# Patient Record
Sex: FEMALE | Race: WHITE
[De-identification: names, ages, dates, MRNs, and addresses within clinical notes are randomized per-mention and may not be internally consistent; named-entity substitution may affect disease eponyms.]

---

## 2018-02-21 ENCOUNTER — HOSPITAL ENCOUNTER (EMERGENCY)
Dept: HOSPITAL 45 - C.EDB | Age: 23
Discharge: HOME | End: 2018-02-21
Payer: COMMERCIAL

## 2018-02-21 VITALS — HEART RATE: 82 BPM | SYSTOLIC BLOOD PRESSURE: 110 MMHG | DIASTOLIC BLOOD PRESSURE: 64 MMHG | OXYGEN SATURATION: 100 %

## 2018-02-21 VITALS — TEMPERATURE: 97.88 F

## 2018-02-21 VITALS
WEIGHT: 105.82 LBS | BODY MASS INDEX: 20.78 KG/M2 | BODY MASS INDEX: 20.78 KG/M2 | WEIGHT: 105.82 LBS | HEIGHT: 60 IN | HEIGHT: 60 IN

## 2018-02-21 DIAGNOSIS — M54.32: ICD-10-CM

## 2018-02-21 DIAGNOSIS — M54.31: ICD-10-CM

## 2018-02-21 DIAGNOSIS — Z72.0: ICD-10-CM
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
## 2018-02-21 DIAGNOSIS — S39.012A: Primary | ICD-10-CM
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
## 2018-02-21 NOTE — DIAGNOSTIC IMAGING REPORT
L-SPINE MIN 4 VIEWS ROUTINE, SACRUM   COCCYX MIN 2 VIEWS



CLINICAL HISTORY: low back pain   



COMPARISON STUDY:  Abdomen and pelvis CT 7/6/2016.



FINDINGS: Stable 3 mm stone within the lower pole of the right kidney. No

fracture or subluxation within the lumbar spine. Disc spaces are relatively

preserved. No fractures identified within the sacrum or coccyx. Bilateral

sacroiliac joints are within normal limits. Presacral soft tissues are

maintained. Small calcification within the right deep pelvis consistent with a

phlebolith.



IMPRESSION:  

1. No fracture or subluxation within the lumbar spine.

2. The sacrum and coccyx are within normal limits.

3. Stable right-sided nephrolithiasis. 









Electronically signed by:  Hilario Miller M.D.

2/21/2018 9:04 PM



Dictated Date/Time:  2/21/2018 9:00 PM

## 2018-02-21 NOTE — EMERGENCY ROOM VISIT NOTE
ED Visit Note


First contact with patient:  19:47


CHIEF COMPLAINT:  Low back pain





HISTORY OF PRESENT ILLNESS:  This 22-year-old female patient presents to the 

emergency department, ambulatory, complaining of pain in the low back which 

began a few days ago, but worsened today.  The pain was gradual in onset, is 

now constant and worse with movement.  The patient notes the pain as sharp and 

shooting and a 10/10 at its worst.  The patient has taken 400 mg ibuprofen 

without relief of the pain.  The patient denies any loss of control of their 

bowel or bladder functions.  There has been no leg numbness or weakness, and no 

change in sensation.  No nausea or vomiting or abdominal pain.  No chest pain 

or shortness of breath.  The patient has not had prior back injuries, but 

states she does occasionally get similar pain to this, usually with certain 

exercises.  No dysuria or increased urinary frequency.  The patient's symptoms 

are better now than they were earlier today.





REVIEW OF SYSTEMS:   A 10 system review of systems was performed with positives 

and pertinent negatives listed in the history of present illness. All other 

systems were reviewed and are negative.





ALLERGIES: None





MEDICATIONS: None





PMH: None





SOCIAL HISTORY:    The patient is a [employer] student.  She lives locally with 

her roommates.  She denies drug use.  She admits to occasional alcohol and 

daily tobacco use.


  


PHYSICAL EXAM: 


VITALS: Vitals are noted on the nurse's note and reviewed by myself.  Vital 

signs stable.


GENERAL: This is a 22-year-old female, in no acute distress, nondiaphoretic, 

well-developed well-nourished.


SKIN: The skin was without rashes, erythema, edema, or bruising.  Capillary 

refill less than 2 seconds.    


NECK: Supple without nuchal rigidity.  No cervical spine tenderness.  No 

paraspinous muscle tenderness.    


HEART: Regular rate and rhythm without murmurs gallops or rubs.


LUNGS: Clear to auscultation bilaterally without wheezes, rales or rhonchi.  


ABDOMEN: Positive bowel sounds x 4.  Normal tympanic percussion.  Soft, 

nontender, without masses or organomegaly.  Olguin sign negative.


MUSCULOSKELETAL: No muscle atrophy, erythema, or edema noted of the back.  

There is no tenderness over the lumbar spinous processes.  There is mild 

tenderness over the paraspinous muscles bilaterally.  There is no tenderness 

over the thoracic spine or paraspinous muscles.  There are muscle spasms 

present.  The patient is slow to move around with maximum tenderness with 

sitting from a lying position.  Positive straight leg raise test on the right.  

There is tenderness at the SI joint.


NEURO: Patient was alert and oriented to person place and time.  Normal 

sensation to light and sharp touch.  Deep tendon reflexes 2+ in the lower 

extremities.  Dorsalis pedis pulse 2+ bilaterally.  Strength 5/5 and equal in 

the bilateral lower extremities.





RADIOLOGY:


L-SPINE MIN 4 VIEWS ROUTINE, SACRUM   COCCYX MIN 2 VIEWS





CLINICAL HISTORY: low back pain   





COMPARISON STUDY:  Abdomen and pelvis CT 7/6/2016.





FINDINGS: Stable 3 mm stone within the lower pole of the right kidney. No


fracture or subluxation within the lumbar spine. Disc spaces are relatively


preserved. No fractures identified within the sacrum or coccyx. Bilateral


sacroiliac joints are within normal limits. Presacral soft tissues are


maintained. Small calcification within the right deep pelvis consistent with a


phlebolith.





IMPRESSION:  


1. No fracture or subluxation within the lumbar spine.


2. The sacrum and coccyx are within normal limits.


3. Stable right-sided nephrolithiasis. 














Electronically signed by:  Hilario Miller M.D.


2/21/2018 9:04 PM





Dictated Date/Time:  2/21/2018 9:00 PM








EMERGENCY DEPARTMENT COURSE: The patient was seen and evaluated as above.  She 

has symptoms consistent with lumbar strain with sciatica, and certainly does 

not exhibit any signs of infection, abscess, or cauda equina syndrome.  She was 

given 8 mg Decadron and 5 mg Flexeril with moderate improvement in her 

symptoms.  X-rays were ordered and reviewed by myself and radiologist as above.

  The patient was reassessed and notes significant improvement.  She is able to 

ambulate more easily now than previously.  The patient will be discharged home 

on steroids and muscle relaxers to help with her symptoms, and is agreeable to 

this assessment and plan.  She was encouraged to follow-up outpatient with 

[facility] for possible physical therapy referral.  The patient'

s questions were answered to her satisfaction.  Discharge instructions reviewed

, and the patient was discharged home in good condition.





I attest that I have personally reviewed the patient's current medication list. 


Patient was found to have normal blood pressure on screening and does not 

require follow-up. 





Etiologies such as lumbago, sciatica, cauda equina, epidural abscess, 

osteomyelitis, fracture, aortic disease, metastatic disease, infection, renal 

colic, gastrointestinal, as well as others were entertained.  





DIAGNOSIS:  Lumbar strain, sciatica


Current/Historical Medications


Scheduled


Methylprednisolone (Medrol Dosepak), 0 PO DAILY





Scheduled PRN


Cyclobenzaprine Hcl (Flexeril), 5 MG PO TID PRN for Muscle Spasms


Ibuprofen Tab (Advil), 400 MG PO Q6 PRN for Pain





Allergies


Coded Allergies:  


     No Known Allergies (Unverified , 3/16/17)





Vital Signs











  Date Time  Temp Pulse Resp B/P (MAP) Pulse Ox O2 Delivery O2 Flow Rate FiO2


 


2/21/18 21:48  82 18 110/64 100   


 


2/21/18 19:52  84 18 107/68 100 Room Air  


 


2/21/18 17:33 36.6 82 18 97/61 96 Room Air  











Medications Administered











 Medications


  (Trade)  Dose


 Ordered  Sig/Frank


 Route  Start Time


 Stop Time Status Last Admin


Dose Admin


 


 Dexamethasone


 Sodium Phosphate


  (Dexamethasone


 Inj **Pf**)  8 mg  NOW  STAT


 IM  2/21/18 20:00


 2/21/18 20:02 DC 2/21/18 20:08


8 MG


 


 Cyclobenzaprine


 HCl


  (Flexeril Tab)  5 mg  NOW  STAT


 PO  2/21/18 20:00


 2/21/18 20:02 DC 2/21/18 20:08


5 MG











Departure Information


Impression





 Primary Impression:  


 Strain of lumbar region


 Additional Impression:  


 Sciatica





Dispostion


Home / Self-Care





Condition


GOOD





Prescriptions





Cyclobenzaprine Hcl (FLEXERIL) 5 Mg Tab


5 MG PO TID Y for Muscle Spasms, #15 TAB


   PRN


   Prov: Alyce Giron PA-C         2/21/18 


Methylprednisolone (MEDROL DOSEPAK) 4 Mg Juan


0 PO DAILY, #1 PKT


   Prov: Alyce Giron PA-C         2/21/18





Referrals


[facility] (PCP)





Patient Instructions


ED Low Back Pain Injury, Exercises Back Lower Back Stretch, My [facility]





Additional Instructions





You have been treated in the Emergency Department for Back Pain.  You have 

received pain medicine in the emergency department which impairs your ability 

to operate a vehicle.





You have been prescribed Flexeril (cyclobenzaprine) 1-2 tabs orally, three 

times per day. Do NOT exceed 30 mg (6 tabs) per day. Take your first dose at 

bedtime as it can make you drowsy. Always take all medications as prescribed.





 You have been prescribed a Medrol Dosepak. This is a steroid which will help 

decrease your inflammation, redness, and itch. Take the medicine as prescribed.

  Take the ENTIRE 6 day course of the steroids.  Do not take any NSAIDs 

including ibuprofen, naproxen, Motrin, Aleve, Advil while taking this 

medication.  You may take Tylenol (acetaminophen).





For pain control, you can use the following over-the-counter medicines (if >11 yo):


Ibuprofen(Motrin, Advil) may be used for fever or pain.  Use 600mg every six 

hours as needed.  Take with food.  Avoid using more than 2400mg in a 24 hour 

period.  Do not use 2400mg per day for more than three consecutive days without 

physician direction.  Prolonged inappropriate use can lead to stomach upset or 

ulcers.  Do not take this medication while taking steroids.


(AND/OR)


Acetaminophen(Tylenol) may be used for fever or pain.  Use 1000mg every six 

hours as needed.  Avoid using more than 3000mg in a 24 hour period.  





If this is an acute injury, ice can be applied to the area of pain for the 

first 3 days to help decrease pain and inflammation. After the first 3 days, a 

heating pad can be used over the area for continued soothing relief.





You should schedule a follow-up appointment in 2-3 days with your Primary Care 

Provider/[facility] for further evaluation and treatment of 

your back pain.





Return to the Emergency Department if your current symptoms worsen despite 

treatment course outlined above, or if you develop any of the following symptoms

: intractable pain despite aforementioned treatment course, loss of control of 

your bowel or bladder, numbness or tingling in your groin, or development of a 

fever.





School Instructions


Return To School:  2 days





Problem Qualifiers








 Primary Impression:  


 Strain of lumbar region


 Encounter type:  initial encounter  Qualified Codes:  S39.012A - Strain of 

muscle, fascia and tendon of lower back, initial encounter


 Additional Impression:  


 Sciatica


 Laterality:  bilateral  Qualified Codes:  M54.31 - Sciatica, right side; 

M54.32 - Sciatica, left side

## 2018-04-16 ENCOUNTER — HOSPITAL ENCOUNTER (EMERGENCY)
Dept: HOSPITAL 45 - C.EDB | Age: 23
Discharge: HOME | End: 2018-04-16
Payer: COMMERCIAL

## 2018-04-16 VITALS
HEIGHT: 60 IN | BODY MASS INDEX: 20.86 KG/M2 | WEIGHT: 106.26 LBS | BODY MASS INDEX: 20.86 KG/M2 | WEIGHT: 106.26 LBS | HEIGHT: 60 IN

## 2018-04-16 VITALS
TEMPERATURE: 98.24 F | SYSTOLIC BLOOD PRESSURE: 100 MMHG | OXYGEN SATURATION: 97 % | DIASTOLIC BLOOD PRESSURE: 60 MMHG | HEART RATE: 70 BPM

## 2018-04-16 DIAGNOSIS — M54.16: ICD-10-CM

## 2018-04-16 DIAGNOSIS — F17.200: Primary | ICD-10-CM

## 2018-04-16 NOTE — EMERGENCY ROOM VISIT NOTE
History


First contact with patient:  16:02


Chief Complaint:  BACK PAIN


Stated Complaint:  SCIATICA





History of Present Illness


The patient is a 22 year old female who presents to the Emergency Room with 

complaints of worsening back pain radiating down the right posterior thigh and 

leg region.  The patient reports that the pain alternates between her buttock 

and leg region.  The patient reports that she was here in February with similar 

symptoms.  She was provided some shots and prescription for a muscle relaxer 

and steroid.  The patient reports that she then followed up with a physical 

therapist at [facility].  The patient reports that she 

only had 3 sessions.  She also saw a chiropractor for a few sessions as well.  

When asked if the patient actually saw a provider at [facility], she does not think that she did.  The patient reports that she is going 

back home to [facility] in 2 weeks when the semester is over.  With her 

current symptoms, the patient denies any bladder/bowel incontinence, saddle 

anesthesias or lower extremity weakness.  She is finding it difficult to study 

for finals as she has to lay mostly on her stomach to relieve the pain.  She 

rates her discomfort a 10 out of 10.





Review of Systems


10 system review was performed and was negative except for pertinent positives 

and negatives as indicated in history of present illness





Past Medical/Surgical History


Medical Problems:


(1) Immunizations up to date








Family History





Patient reports no known family medical history.





Social History


Smoking Status:  Current Every Day Smoker


Drug Use:  none


Marital Status:  single


Occupation Status:  [employer]





Current/Historical Medications


Scheduled


Methylprednisolone (Medrol Dosepak), 0 PO DAILY





Scheduled PRN


Ibuprofen Tab (Advil), 400 MG PO Q6 PRN for Pain


Tramadol (Ultram), 1-2 TAB PO Q4H PRN for Pain





Physical Exam


Vital Signs











  Date Time  Temp Pulse Resp B/P (MAP) Pulse Ox O2 Delivery O2 Flow Rate FiO2


 


4/16/18 15:58 36.8 70 18 100/60 97 Room Air  











Physical Exam


CONSTITUTIONAL:  Healthy and well nourished.  Alert and oriented X 3 with 

positive affect.  Patient does not appear in any acute distress.  The patient 

was laying in the supine position during exam.


HEENT:  Normocephalic, atraumatic.  Pupils equal, round and reactive.  


NECK:  Full active range of motion without discomfort.


RESPIRATORY:  Clear to auscultation bilaterally with no wheezing, crackles, 

rhonchi or stridor.


CARDIOVASCULAR:  Regular rate and rhythm with no murmurs, rubs or gallops.


GASTROINTESTINAL:  Bowel sounds present in all quadrants.  Soft and nontender 

to palpation.


MUSCULOSKELETAL: Examination shows generalized tenderness to palpation through 

the right lower lumbar region and SI joint.  Negative logroll.  Mildly positive 

straight leg raise.  Ankle plantar and dorsiflexion strength are 5 out of 5 and 

symmetric bilaterally.  Pedal pulses are intact.


INTEGUMENTARY:  No rash or other significant dermatologic conditions noted.


NEUROLOGIC: Lower extremities are sensory intact with deep tendon reflexes 2+ 

and symmetric bilaterally.





Medical Decision & Procedures


Medications Administered











 Medications


  (Trade)  Dose


 Ordered  Sig/Frank


 Route  Start Time


 Stop Time Status Last Admin


Dose Admin


 


 Ketorolac


 Tromethamine


  (Toradol Inj)  60 mg  NOW  STAT


 IM  4/16/18 16:34


 4/16/18 16:36 DC 4/16/18 17:09


60 MG


 


 Dexamethasone


 Sodium Phosphate


  (Dexamethasone


 Inj **Pf**)  10 mg  NOW  ONCE


 IM  4/16/18 16:45


 4/16/18 16:46 DC 4/16/18 17:09


10 MG


 


 Tramadol HCl


  (Ultram Tab)  50 mg  NOW  STAT


 PO  4/16/18 16:34


 4/16/18 16:36 DC 4/16/18 17:08


50 MG











ED Course


Patient history and physical exam were performed.  Nurse's notes were reviewed.

  Vital signs were reviewed and were normal.  I also reviewed documentation 

from the patient's visit on 2/21/18.  She did have x-rays performed that were 

normal.  She was provided prescriptions for Flexeril and a Medrol Dosepak, and 

was referred back to her PCP or [facility] for follow-up.

  The patient appears to have a worsening radiculitis at this time.  I did 

suggest starting her again on a corticosteroid, and providing a prescription 

for Ultram.  The patient was advised that she could alternate ibuprofen and 

Tylenol for additional baseline pain relief.  The patient was encouraged to 

either follow-up with [facility] officially, or she was 

also provided contact information for [facility].  She was 

provided contact information for Dr. Prieto, spine surgeon, although I doubt 

that she would have any definitive evaluation and treatment plan established 

within the next 2 weeks.  The patient has essentially met criteria for most 

insurance companies to authorize an MRI, however I explained to the patient 

that the emergency department does not do prior authorization for MRI studies.  

The patient was advised that if she does not have an MRI performed prior to 

going home, she may discuss this with her PCP back in [facility].  The 

patient was administered Toradol 60 mg and Decadron 10 mg IM.  The patient was 

happy with plan of care, voiced understanding of all discharge instructions, 

was appreciative of the education that I provided over an approximate 30 

minutes evaluation, and rated her discomfort a 6 out of 10 at the conclusion of 

my exam.





Medical Decision


See previous section.  Patient history and clinical exam findings are not 

consistent with cauda equina syndrome.  I do not suspect spinal abscess, 

hematoma or discitis.  Her symptoms are consistent with a lumbar radiculitis.





[place] Drug Monitoring Program


Search Results:  patient reviewed within database, no issues identified





Medication Reconcilliation


Current Medication List:  was personally reviewed by me





Blood Pressure Screening


Patient's blood pressure:  Normal blood pressure





Impression





 Primary Impression:  


 Right lumbar radiculitis





Departure Information


Dispostion


Home / Self-Care





Condition


FAIR





Prescriptions





Methylprednisolone (MEDROL DOSEPAK) 4 Mg Juan


0 PO DAILY, #1 PKT


   Prov: Geovanni Choi PA         4/16/18 


Tramadol (Ultram) 50 Mg Tab


1-2 TAB PO Q4H Y for Pain, #30 TAB


   For Initial Treatment


   Prov: Geovanni Choi PA         4/16/18





Referrals


Navid Prieto D.O. Wagner,Venita JIMENEZP.





Forms


HOME CARE DOCUMENTATION FORM,                                                 

               IMPORTANT VISIT INFORMATION





Patient Instructions


Lumbar Radiculopathy, My [facility]





Additional Instructions





Try to avoid sitting for long periods of time.  Avoid heavy lifting or other 

strenuous activities.


Ibuprofen 800 mg and/or Tylenol 1000 mg every 8 hours.


You may also alternate these medications for more effective pain relief:


Ibuprofen --4 HRS--> Tylenol --4 HRS--> ibuprofen --4 HRS--> Tylenol ....


Ultram if needed for worse pain.


Take Medrol Dosepak as prescribed, next dose tomorrow evening.





Suggest follow-up with [facility] or [facility] 

[facility] (JENNIFER Fernandez) for further reevaluation and management.


You may also try to contact the [facility] (Dr. Priteo

) to see if they can evaluate you before you return home.





Return to the emergency department for any developing bladder/bowel incontinence

, numbness of the inner thigh/pubic region or profound right lower extremity 

weakness.

## 2018-04-22 ENCOUNTER — HOSPITAL ENCOUNTER (EMERGENCY)
Dept: HOSPITAL 45 - C.EDB | Age: 23
Discharge: HOME | End: 2018-04-22
Payer: COMMERCIAL

## 2018-04-22 VITALS — OXYGEN SATURATION: 98 % | DIASTOLIC BLOOD PRESSURE: 54 MMHG | SYSTOLIC BLOOD PRESSURE: 95 MMHG | HEART RATE: 57 BPM

## 2018-04-22 VITALS
BODY MASS INDEX: 19.11 KG/M2 | WEIGHT: 105.16 LBS | WEIGHT: 105.16 LBS | HEIGHT: 62.01 IN | HEIGHT: 62.01 IN | BODY MASS INDEX: 19.11 KG/M2

## 2018-04-22 VITALS — TEMPERATURE: 97.7 F

## 2018-04-22 DIAGNOSIS — F17.200: ICD-10-CM

## 2018-04-22 DIAGNOSIS — M51.16: Primary | ICD-10-CM

## 2018-04-22 NOTE — EMERGENCY ROOM VISIT NOTE
History


First contact with patient:  18:04


Chief Complaint:  OTHER COMPLAINT


Stated Complaint:  SIACTICA





History of Present Illness


The patient is a 22 year old female who presents to the Emergency Room with 

complaints of persistent low back pain.  The patient reports that she has had 

pain in her low back for the past 2 months.  She states the pain radiates into 

her right buttock and right thigh.  She has relief laying down on her side and 

states the pain is worse when she is up walking around or sitting.  She rates 

her current discomfort a 10/10.  She states the pain as a dull pain and she has 

associated numbness at times in her right leg and buttocks.  This numbness is 

intermittent.  The pain sometimes radiates all the way down the leg into the 

right foot.  She states that she has tingling and feelings of pins and needles 

at times.  She has had courses of steroids and injections for the pain.  She 

has seen [facility] and did physical therapy but states that 

she did not like physical therapy, because they did not perform any tests and 

did not put her on a set schedule.  She has been to the chiropractor twice and 

has had massages without relief of her pain.  She was seen by orthopedic spine 

and they are working to have an MRI authorized.  The patient states that she 

finished her steroid pack today and does not want to wait any longer for an 

MRI.  She denies any bowel/bladder incontinence, urinary symptoms, fevers or 

weakness.





Review of Systems


A complete 10 point review of systems was reviewed with the patient with 

pertinent positives and negatives as per history of present illness. All else 

were negative.





Past Medical/Surgical History


Medical Problems:


(1) Immunizations up to date








Family History





Patient reports no known family medical history.





Social History


Smoking Status:  Current Some Day Smoker


Drug Use:  none


Marital Status:  single


Occupation Status:  [employer] student





Current/Historical Medications


Scheduled


Methylprednisolone (Medrol Dosepak), 0 PO DAILY





Scheduled PRN


Acetaminophen (Tylenol), 1,000 MG PO Q6H PRN for Pain


Ibuprofen Tab (Advil), 400 MG PO Q6H PRN for Pain


Tramadol (Ultram), 1-2 TAB PO Q4H PRN for Pain





Physical Exam


Vital Signs











  Date Time  Temp Pulse Resp B/P (MAP) Pulse Ox O2 Delivery O2 Flow Rate FiO2


 


4/22/18 20:56  57 18 95/54 98   


 


4/22/18 17:57 36.5 86 18 102/69 97 Room Air  











Physical Exam


VITALS: Vitals are noted on the nurse's note and reviewed by myself.  Vital 

signs stable.


GENERAL: This is a 22-year-old female, in no apparent distress, well-developed 

well-nourished.


SKIN: The skin was without rashes.


HEAD: Normocephalic atraumatic.  


HEART: Regular rate and rhythm without murmurs gallops or rubs.


LUNGS: Clear to auscultation bilaterally without wheezes, rales or rhonchi.  


ABDOMEN: Positive bowel sounds x 4.  Soft, nontender to palpation.


MUSCULOSKELETAL: No significant tenderness to palpation in the lumbar spine or 

paraspinous muscles.  Full range of motion and strength 5/5 in bilateral lower 

extremities.


NEURO: Patient was alert and oriented to person place and time.  Sensation 

intact over bilateral lower extremities.  Patellar reflexes 2+ bilaterally.





Medical Decision & Procedures


ER Provider


Diagnostic Interpretation:


LUMBAR SPINE W/O CONTRAST





HISTORY: Pain  low back pain, radicular sxs right leg, numbness





TECHNIQUE: Multiplanar multisequence MRI of the lumbar spine was performed


without the use of contrast.





COMPARISON:  None.





FINDINGS: For the purpose of the report the L5-S1 disc space will be located on


axial image 2431.


Normal signal characteristics of the vertebral bodies. Moderate degenerative


disc change L5-S1 with mild disc desiccation L4-L5. Posterior disc herniation


L5-S1 with a posterior extruded disc. Bulging disc component L4-L5





L1-L2: No significant central canal or neural foraminal narrowing.





L2-L3: No significant central canal or neural foraminal narrowing.





L3-L4: No significant central canal or neural foraminal narrowing.





L4-L5: Central bulging disc creating a mild impact upon the anterior thecal sac.


Neuroforamina are patent bilaterally.





L5-S1: Right posterior disc herniation with extruded disc fragment extending


posterior to the S1 segment. The extruded disc fragment has a maximum


cephalocaudal dimension of 1.5 cm with transaxial measurements of 10 x 8 mm.


Local impact 1 thecal sac and associated nerve roots is present. There is


minimal narrowing of the right neuroforamina. Left neural foramina is patent.


Bulk of the impact his upon the thecal sac itself.





IMPRESSION:  





1. Prominent right posterior disc herniation L5-S1.


2. This is associated with an extruded disc extending posterior to the right


central aspect of the S1 segment. Dimensions of the extruded disc fragment are


1.5 cm x 10 x 8 mm in cross-section


3. Localized mass effect as described with considerable deformity/displacement


of the thecal sac posterior to the S1 segment.


4. Central bulging disc L4-L5. 





Electronically signed by:  Damaso Lee M.D.





Laboratory Results











Test


  4/22/18


18:25


 


Urine Color YELLOW 


 


Urine Appearance CLOUDY (CLEAR) 


 


Urine pH 7.0 (4.5-7.5) 


 


Urine Specific Gravity


  1.017


(1.000-1.030)


 


Urine Protein NEG (NEG) 


 


Urine Glucose (UA) NEG (NEG) 


 


Urine Ketones NEG (NEG) 


 


Urine Occult Blood NEG (NEG) 


 


Urine Nitrite NEG (NEG) 


 


Urine Bilirubin NEG (NEG) 


 


Urine Urobilinogen NEG (NEG) 


 


Urine Leukocyte Esterase NEG (NEG) 


 


Urine WBC (Auto) 1-5 /hpf (0-5) 


 


Urine RBC (Auto) 0-4 /hpf (0-4) 


 


Urine Hyaline Casts (Auto) 1-5 /lpf (0-5) 


 


Urine Epithelial Cells (Auto)


  20-30 /lpf


(0-5)


 


Urine Bacteria (Auto) NEG (NEG) 


 


Urine Pregnancy Test NEG (NEG) 











ED Course


The patient was evaluated as above. 





Patient declined any pain medication.





MRI was performed and read by radiology as above. 





I spoke with Dr. Prieto regarding the patient.  He recommended follow up in the 

office within 48 hours.





Patient was reevaluated and treatment plan was discussed.  The patient was 

agreeable to the treatment plan.





Discharge instructions were reviewed with the patient.  The patient verbalized 

understanding of my assessment and treatment plan and was discharged home in 

good condition.





Medical Decision


Differential diagnosis includes cauda equina syndrome, cord compression, disc 

herniation, muscle spasm, lumbar strain, epidural abscess, malignancy, 

transverse myelitis, urinary tract infection, colitis, diverticulitis, kidney 

stone, among others.





The patient is a 22-year-old female who presents today complaining of 

persistent low back pain.  Patient has had multiple visits here and 

additionally has seen orthopedic spine and [facility] services for this 

issue.  She has been on multiple rounds of steroids and has been taking 

tramadol for the pain.  Patient complains of worsening numbness in her right 

groin which has been intermittent.  Sensation is intact on exam today, however 

given worsening radicular symptoms MRI was ordered.  This did show a large disc 

herniation as described above.  Discussed the case with Dr. Prieto of 

orthopedic spine, who recommended follow-up in the office within 48 hours.  The 

patient declined any further analgesics, and states that the tramadol works 

well for her.  She was advised to call the office first thing in the morning.  

She will return for any worsening or new/concerning symptoms.  She verbalized 

understanding of my assessment and treatment plan and was discharged home in 

good condition.





[place] Drug Monitoring Program


Search Results:  patient reviewed within database, no issues identified





Medication Reconcilliation


Current Medication List:  was personally reviewed by me





Blood Pressure Screening


Patient's blood pressure:  Normal blood pressure





Impression





 Primary Impression:  


 Lumbar disc herniation with radiculopathy





Departure Information


Dispostion


Home / Self-Care





Condition


GOOD





Referrals


No Doctor, Assigned (PCP)








Lee Brothers M.D.





Patient Instructions


My [facility]





Additional Instructions





You have been treated in the Emergency Department for Back Pain. 





Continue your previous pain medications as prescribed.





Contact Dr. Brothers's office first thing in the morning to schedule follow-up 

appointment either Monday or Tuesday.  You can tell them that you were in the 

emergency department and had an MRI, and that we spoke with Dr. Prieto who 

recommended to follow-up within 48 hours.





Return to the Emergency Department if your current symptoms worsen despite 

treatment course outlined above, or if you develop any of the following symptoms

: intractable pain despite aforementioned treatment course, loss of control of 

your bowel or bladder, numbness or tingling in your groin, or development of a 

fever.

## 2018-04-22 NOTE — DIAGNOSTIC IMAGING REPORT
LUMBAR SPINE W/O CONTRAST



HISTORY: Pain  low back pain, radicular sxs right leg, numbness



TECHNIQUE: Multiplanar multisequence MRI of the lumbar spine was performed

without the use of contrast.



COMPARISON:  None.



FINDINGS: For the purpose of the report the L5-S1 disc space will be located on

axial image 2431.

Normal signal characteristics of the vertebral bodies. Moderate degenerative

disc change L5-S1 with mild disc desiccation L4-L5. Posterior disc herniation

L5-S1 with a posterior extruded disc. Bulging disc component L4-L5



L1-L2: No significant central canal or neural foraminal narrowing.



L2-L3: No significant central canal or neural foraminal narrowing.



L3-L4: No significant central canal or neural foraminal narrowing.



L4-L5: Central bulging disc creating a mild impact upon the anterior thecal sac.

Neuroforamina are patent bilaterally.



L5-S1: Right posterior disc herniation with extruded disc fragment extending

posterior to the S1 segment. The extruded disc fragment has a maximum

cephalocaudal dimension of 1.5 cm with transaxial measurements of 10 x 8 mm.

Local impact 1 thecal sac and associated nerve roots is present. There is

minimal narrowing of the right neuroforamina. Left neural foramina is patent.

Bulk of the impact his upon the thecal sac itself.



IMPRESSION:  



1. Prominent right posterior disc herniation L5-S1.





2. This is associated with an extruded disc extending posterior to the right

central aspect of the S1 segment. Dimensions of the extruded disc fragment are

1.5 cm x 10 x 8 mm in cross-section





3. Localized mass effect as described with considerable deformity/displacement

of the thecal sac posterior to the S1 segment.

4. Central bulging disc L4-L5. 







The above report was generated using voice recognition software.  It may contain

grammatical, syntax or spelling errors.







Electronically signed by:  Damaso Lee M.D.

4/22/2018 8:13 PM



Dictated Date/Time:  4/22/2018 8:06 PM

## 2018-04-23 ENCOUNTER — HOSPITAL ENCOUNTER (INPATIENT)
Dept: HOSPITAL 45 - C.EDB | Age: 23
LOS: 4 days | Discharge: HOME | DRG: 454 | End: 2018-04-27
Attending: ORTHOPAEDIC SURGERY | Admitting: ORTHOPAEDIC SURGERY
Payer: COMMERCIAL

## 2018-04-23 VITALS
BODY MASS INDEX: 20.13 KG/M2 | WEIGHT: 102.51 LBS | HEIGHT: 60 IN | BODY MASS INDEX: 20.13 KG/M2 | HEIGHT: 60 IN | WEIGHT: 102.51 LBS

## 2018-04-23 VITALS
OXYGEN SATURATION: 94 % | TEMPERATURE: 98.06 F | SYSTOLIC BLOOD PRESSURE: 99 MMHG | HEART RATE: 66 BPM | DIASTOLIC BLOOD PRESSURE: 62 MMHG

## 2018-04-23 VITALS
SYSTOLIC BLOOD PRESSURE: 110 MMHG | DIASTOLIC BLOOD PRESSURE: 73 MMHG | TEMPERATURE: 98.24 F | HEART RATE: 63 BPM | OXYGEN SATURATION: 100 %

## 2018-04-23 VITALS
DIASTOLIC BLOOD PRESSURE: 64 MMHG | HEART RATE: 61 BPM | SYSTOLIC BLOOD PRESSURE: 107 MMHG | TEMPERATURE: 98.24 F | OXYGEN SATURATION: 99 %

## 2018-04-23 VITALS — OXYGEN SATURATION: 100 %

## 2018-04-23 VITALS
TEMPERATURE: 98.06 F | DIASTOLIC BLOOD PRESSURE: 58 MMHG | SYSTOLIC BLOOD PRESSURE: 98 MMHG | OXYGEN SATURATION: 100 % | HEART RATE: 72 BPM

## 2018-04-23 VITALS
OXYGEN SATURATION: 100 % | DIASTOLIC BLOOD PRESSURE: 67 MMHG | HEART RATE: 67 BPM | SYSTOLIC BLOOD PRESSURE: 104 MMHG | TEMPERATURE: 98.06 F

## 2018-04-23 DIAGNOSIS — M51.26: Primary | ICD-10-CM

## 2018-04-23 DIAGNOSIS — G83.4: ICD-10-CM

## 2018-04-23 DIAGNOSIS — R32: ICD-10-CM

## 2018-04-23 DIAGNOSIS — R33.9: ICD-10-CM

## 2018-04-23 LAB
BASOPHILS # BLD: 0.02 K/UL (ref 0–0.2)
BASOPHILS NFR BLD: 0.2 %
BUN SERPL-MCNC: 20 MG/DL (ref 7–18)
CALCIUM SERPL-MCNC: 9 MG/DL (ref 8.5–10.1)
CO2 SERPL-SCNC: 33 MMOL/L (ref 21–32)
CREAT SERPL-MCNC: 0.85 MG/DL (ref 0.6–1.2)
EOS ABS #: 0.08 K/UL (ref 0–0.5)
EOSINOPHIL NFR BLD AUTO: 225 K/UL (ref 130–400)
GLUCOSE SERPL-MCNC: 80 MG/DL (ref 70–99)
HCT VFR BLD CALC: 44.7 % (ref 37–47)
HGB BLD-MCNC: 15 G/DL (ref 12–16)
IG#: 0.04 K/UL (ref 0–0.02)
IMM GRANULOCYTES NFR BLD AUTO: 28.7 %
LYMPHOCYTES # BLD: 2.96 K/UL (ref 1.2–3.4)
MCH RBC QN AUTO: 27.1 PG (ref 25–34)
MCHC RBC AUTO-ENTMCNC: 33.6 G/DL (ref 32–36)
MCV RBC AUTO: 80.7 FL (ref 80–100)
MONO ABS #: 0.76 K/UL (ref 0.11–0.59)
MONOCYTES NFR BLD: 7.4 %
NEUT ABS #: 6.46 K/UL (ref 1.4–6.5)
NEUTROPHILS # BLD AUTO: 0.8 %
NEUTROPHILS NFR BLD AUTO: 62.5 %
PMV BLD AUTO: 11.1 FL (ref 7.4–10.4)
POTASSIUM SERPL-SCNC: 3.2 MMOL/L (ref 3.5–5.1)
RED CELL DISTRIBUTION WIDTH CV: 16 % (ref 11.5–14.5)
RED CELL DISTRIBUTION WIDTH SD: 46.8 FL (ref 36.4–46.3)
SODIUM SERPL-SCNC: 140 MMOL/L (ref 136–145)
WBC # BLD AUTO: 10.32 K/UL (ref 4.8–10.8)

## 2018-04-23 PROCEDURE — 0ST40ZZ RESECTION OF LUMBOSACRAL DISC, OPEN APPROACH: ICD-10-PCS | Performed by: ORTHOPAEDIC SURGERY

## 2018-04-23 PROCEDURE — 0SG30AJ FUSION OF LUMBOSACRAL JOINT WITH INTERBODY FUSION DEVICE, POSTERIOR APPROACH, ANTERIOR COLUMN, OPEN APPROACH: ICD-10-PCS | Performed by: ORTHOPAEDIC SURGERY

## 2018-04-23 PROCEDURE — 0SG3071 FUSION OF LUMBOSACRAL JOINT WITH AUTOLOGOUS TISSUE SUBSTITUTE, POSTERIOR APPROACH, POSTERIOR COLUMN, OPEN APPROACH: ICD-10-PCS | Performed by: ORTHOPAEDIC SURGERY

## 2018-04-23 RX ADMIN — FENTANYL CITRATE PRN MCG: 50 INJECTION, SOLUTION INTRAMUSCULAR; INTRAVENOUS at 19:10

## 2018-04-23 RX ADMIN — ONDANSETRON PRN MG: 2 INJECTION INTRAMUSCULAR; INTRAVENOUS at 22:58

## 2018-04-23 RX ADMIN — FENTANYL CITRATE PRN MCG: 50 INJECTION, SOLUTION INTRAMUSCULAR; INTRAVENOUS at 19:02

## 2018-04-23 RX ADMIN — SODIUM CHLORIDE, SODIUM LACTATE, POTASSIUM CHLORIDE, AND CALCIUM CHLORIDE SCH MLS/HR: 600; 310; 30; 20 INJECTION, SOLUTION INTRAVENOUS at 21:18

## 2018-04-23 RX ADMIN — STANDARDIZED SENNA CONCENTRATE AND DOCUSATE SODIUM SCH TAB: 8.6; 5 TABLET ORAL at 21:39

## 2018-04-23 NOTE — EMERGENCY ROOM VISIT NOTE
History


Report prepared by Coleen:  Rah Bonilla


Under the Supervision of:  Dr. Bright Pope M.D.


First contact with patient:  14:44


Chief Complaint:  ABDOMINAL PAIN


Stated Complaint:  NUMBNESS TINGLING IN GROIN AREA, VOMITING





History of Present Illness


The patient is a 22 year old female who presents to the Emergency Room with 

complaints of worsening low back pain beginning two months ago. She rates her 

current pain at a 9/10 in severity. Her pain is worsened with movement. The 

patient also complains of numbness of her groin and states that she is unable 

to push out urine. She notes that she vomited three times today after she 

started taking Tramadol. She has a history of occasional lower back pain, but 

states that it normally resolves with exercise. The patient was seen in the ED 

two months ago for similar symptoms. She was noted to have a kidney stone at 

this time which was thought to be coincidental. She was referred to physical 

therapy at this time. The patient saw physical therapy three times, but stopped 

going because she felt "like they didn't have a real plan", and "like they 

thought it was recreation for me". She has seen a chiropractor multiple times 

within the past month. She saw orthopedics a few days ago and was told that 

they would work on scheduling an MRI. The patient was seen in the ED yesterday 

for worsening of her symptoms, and had an MRI of her lumbar spine. Her MRI 

showed a large lumbar disc herniation. The patient notes that she called 

orthopedics today and is scheduled to see them tomorrow. She finished a course 

of of steroids yesterday. She denies chance of pregnancy.





   Source of History:  patient


   Onset:  Two months ago


   Position:  back (lower)


   Symptom Intensity:  9/10


   Timing:  worsening


   Modifying Factors (Worsening):  movement


   Associated Symptoms:  + vomiting, + urinary symptoms (inability to push out 

urine), + numbness (groin)





Review of Systems


See HPI for pertinent positives & negatives. A total of 10 systems reviewed and 

were otherwise negative.





Past Medical & Surgical


Medical Problems:


(1) Immunizations up to date








Family History





Patient reports no known family medical history.





Social History


Smoking Status:  Never Smoker


Drug Use:  none


Marital Status:  single


Occupation Status:  [employer] student





Current/Historical Medications


Scheduled


Citalopram Hydrobromide (Citalopram Hydrobromide), 15 MG PO DAILY





Scheduled PRN


Acetaminophen (Tylenol), 1,000 MG PO Q6H PRN for Pain


Ibuprofen Tab (Advil), 400 MG PO Q6H PRN for Pain


Tramadol HCl (Tramadol HCl),  MG PO Q4 PRN for Pain





Allergies


Coded Allergies:  


     No Known Allergies (Unverified , 3/16/17)





Physical Exam


Vital Signs











  Date Time  Temp Pulse Resp B/P (MAP) Pulse Ox O2 Delivery O2 Flow Rate FiO2


 


4/23/18 15:48  68 18 123/68 100 Room Air  


 


4/23/18 14:36 36.3 89 20 112/75 97 Room Air  











Physical Exam


GENERAL: Patient is in no acute distress.


HEENT: No acute trauma, normocephalic atraumatic, mucous membranes dry, no 

nasal congestion, no scleral icterus.


NECK: No stridor, no adenopathy, no meningismus, trachea is midline.


LUNGS: Clear to auscultation bilaterally, no wheeze, no rhonchi, breath sounds 

equal.


HEART: Without murmurs gallops or rubs, regular rate and rhythm.


ABDOMEN: Soft, nontender, bowel sounds positive, no hernias, no peritonitis.


EXTREMITIES: No cyanosis or edema, full range of motion of all the joints 

without pain or difficulty, no signs for acute trauma.


NEUROLOGIC: Oriented x 3, no acute motor or sensory deficits, no focal 

weakness. 3/4 patellar and Achilles reflexes bilaterally. 


SKIN: No rash, no jaundice, no diaphoresis.





Medical Decision & Procedures


ER Provider


Diagnostic Interpretation:


Radiology results as stated below per my review and radiologist interpretation:





LUMBAR SPINE W/O CONTRAST





FINDINGS: For the purpose of the report the L5-S1 disc space will be located on


axial image 2431.


Normal signal characteristics of the vertebral bodies. Moderate degenerative


disc change L5-S1 with mild disc desiccation L4-L5. Posterior disc herniation


L5-S1 with a posterior extruded disc. Bulging disc component L4-L5





L1-L2: No significant central canal or neural foraminal narrowing.





L2-L3: No significant central canal or neural foraminal narrowing.





L3-L4: No significant central canal or neural foraminal narrowing.





L4-L5: Central bulging disc creating a mild impact upon the anterior thecal sac.


Neuroforamina are patent bilaterally.





L5-S1: Right posterior disc herniation with extruded disc fragment extending


posterior to the S1 segment. The extruded disc fragment has a maximum


cephalocaudal dimension of 1.5 cm with transaxial measurements of 10 x 8 mm.


Local impact 1 thecal sac and associated nerve roots is present. There is


minimal narrowing of the right neuroforamina. Left neural foramina is patent.


Bulk of the impact his upon the thecal sac itself.





IMPRESSION:  





1. Prominent right posterior disc herniation L5-S1.








2. This is associated with an extruded disc extending posterior to the right


central aspect of the S1 segment. Dimensions of the extruded disc fragment are


1.5 cm x 10 x 8 mm in cross-section








3. Localized mass effect as described with considerable deformity/displacement


of the thecal sac posterior to the S1 segment.


4. Central bulging disc L4-L5. 





The above report was generated using voice recognition software.  It may contain


grammatical, syntax or spelling errors.





Electronically signed by:  Damaso Lee M.D.


4/22/2018 8:13 PM





Laboratory Results


4/23/18 15:10








Red Blood Count 5.54, Mean Corpuscular Volume 80.7, Mean Corpuscular Hemoglobin 

27.1, Mean Corpuscular Hemoglobin Concent 33.6, Mean Platelet Volume 11.1, 

Neutrophils (%) (Auto) 62.5, Lymphocytes (%) (Auto) 28.7, Monocytes (%) (Auto) 

7.4, Eosinophils (%) (Auto) 0.8, Basophils (%) (Auto) 0.2, Neutrophils # (Auto) 

6.46, Lymphocytes # (Auto) 2.96, Monocytes # (Auto) 0.76, Eosinophils # (Auto) 

0.08, Basophils # (Auto) 0.02





4/23/18 15:10

















Test


  4/23/18


13:50 4/23/18


15:10


 


Urine Color YELLOW  


 


Urine Appearance CLEAR (CLEAR)  


 


Urine pH 7.5 (4.5-7.5)  


 


Urine Specific Gravity


  1.014


(1.000-1.030) 


 


 


Urine Protein NEG (NEG)  


 


Urine Glucose (UA) NEG (NEG)  


 


Urine Ketones NEG (NEG)  


 


Urine Occult Blood 1+ (NEG)  


 


Urine Nitrite NEG (NEG)  


 


Urine Bilirubin NEG (NEG)  


 


Urine Urobilinogen NEG (NEG)  


 


Urine Leukocyte Esterase TRACE (NEG)  


 


Urine WBC (Auto)


  5-10 /hpf


(0-5) 


 


 


Urine RBC (Auto)


  10-30 /hpf


(0-4) 


 


 


Urine Hyaline Casts (Auto)


  5-10 /lpf


(0-5) 


 


 


Urine Epithelial Cells (Auto) >30 /lpf (0-5)  


 


Urine Bacteria (Auto) NEG (NEG)  


 


Urine Renal Epithelial Cells  /lpf (0-5)  


 


Urine Pregnancy Test NEG (NEG)  


 


White Blood Count


  


  10.32 K/uL


(4.8-10.8)


 


Red Blood Count


  


  5.54 M/uL


(4.2-5.4)


 


Hemoglobin


  


  15.0 g/dL


(12.0-16.0)


 


Hematocrit  44.7 % (37-47) 


 


Mean Corpuscular Volume


  


  80.7 fL


()


 


Mean Corpuscular Hemoglobin


  


  27.1 pg


(25-34)


 


Mean Corpuscular Hemoglobin


Concent 


  33.6 g/dl


(32-36)


 


Platelet Count


  


  225 K/uL


(130-400)


 


Mean Platelet Volume


  


  11.1 fL


(7.4-10.4)


 


Neutrophils (%) (Auto)  62.5 % 


 


Lymphocytes (%) (Auto)  28.7 % 


 


Monocytes (%) (Auto)  7.4 % 


 


Eosinophils (%) (Auto)  0.8 % 


 


Basophils (%) (Auto)  0.2 % 


 


Neutrophils # (Auto)


  


  6.46 K/uL


(1.4-6.5)


 


Lymphocytes # (Auto)


  


  2.96 K/uL


(1.2-3.4)


 


Monocytes # (Auto)


  


  0.76 K/uL


(0.11-0.59)


 


Eosinophils # (Auto)


  


  0.08 K/uL


(0-0.5)


 


Basophils # (Auto)


  


  0.02 K/uL


(0-0.2)


 


RDW Standard Deviation


  


  46.8 fL


(36.4-46.3)


 


RDW Coefficient of Variation


  


  16.0 %


(11.5-14.5)


 


Immature Granulocyte % (Auto)  0.4 % 


 


Immature Granulocyte # (Auto)


  


  0.04 K/uL


(0.00-0.02)


 


Anion Gap


  


  4.0 mmol/L


(3-11)


 


Est Creatinine Clear Calc


Drug Dose 


  74.6 ml/min 


 


 


Estimated GFR (


American) 


  112.7 


 


 


Estimated GFR (Non-


American 


  97.3 


 


 


BUN/Creatinine Ratio  23.3 (10-20) 


 


Calcium Level


  


  9.0 mg/dl


(8.5-10.1)


 


Human Chorionic Gonadotropin,


Qual 


  NEG (NEG) 


 





Laboratory results reviewed by me.





Medications Administered











 Medications


  (Trade)  Dose


 Ordered  Sig/Frank


 Route  Start Time


 Stop Time Status Last Admin


Dose Admin


 


 Methylprednisolone


 Sodium Succinate


  (Solu-Medrol IV)  60 mg  NOW  STAT


 IV  4/23/18 14:52


 4/23/18 14:56 DC 4/23/18 15:13


60 MG


 


 Ondansetron HCl


  (Zofran Inj)  4 mg  NOW  STAT


 IV  4/23/18 14:52


 4/23/18 14:56 DC 4/23/18 15:12


4 MG


 


 Ketorolac


 Tromethamine


  (Toradol Inj)  30 mg  NOW  STAT


 IV  4/23/18 14:52


 4/23/18 14:56 DC 4/23/18 15:13


30 MG


 


 Sodium Chloride  500 ml @ 


 999 mls/hr  Q31M STAT


 IV  4/23/18 14:52


 4/23/18 15:22 DC 4/23/18 15:12


999 MLS/HR











ED Course


1445: The patient was evaluated in room B2. A complete history and physical 

exam was performed.





1452: Ordered Sodium Chloride 500 ml @ 999 mls/hr IV, Toradol Inj 30 mg IV, 

Zofran Inj 4 mg IV, Solu-Medrol 60 mg IV. 





1500: Upon reexamination the patient is resting. I discussed the treatment plan 

with the patient. She verbalizes agreement and understanding. I spoke with Dr. Prieto of Orthopedics. We discussed the patient's results and findings. The 

patient will be evaluated by Orthopedics for further management.





Medical Decision


The patient is a 22 year old female who presents to the ED with complaints of 

low back pain and groin numbness.  Differential diagnoses considered include 

disc herniation, cauda equina syndrome, spinal stenosis, UTI, dehydration and 

electrolyte imbalance. 





There is no leukocytosis or concerning anemia.  No significant electrolyte 

abnormality or kidney failure.  Urinalysis shows contamination, no obvious 

infection.  Pregnancy testing is negative.  On exam, the patient did have good 

reflexes in both lower extremities.  Imaging (an MRI) of her lumbar spine 

yesterday shows a large disc herniation with thecal sac compression.





Patient received IV Toradol, IV Solu-Medrol and IV Zofran, she received IV 

saline.





I do think a hospital stay is warranted.  She is failing outpatient treatment.  

She has been to see multiple providers and imaging does show a very large disc 

herniation.  I spoke with the spinal surgeon on call, I spoke with case 

management.  The patient is aware of her findings.





Medication Reconcilliation


Current Medication List:  was personally reviewed by me





Blood Pressure Screening


Patient's blood pressure:  Normal blood pressure


Blood pressure disposition:  Did not require urgent referral





Consults


Time Called:  [id number]


Consulting Physician:  Dr. Prieto - Orthopedics


Returned Call:  [phone]


Discussed the patient's case. Dr. Prieto will come see the patient in the ED.





Impression





 Primary Impression:  


 Lumbar disc herniation


 Additional Impressions:  


 Bilateral leg numbness


 Failure of outpatient treatment





Scribe Attestation


The scribe's documentation has been prepared under my direction and personally 

reviewed by me in its entirety. I confirm that the note above accurately 

reflects all work, treatment, procedures, and medical decision making performed 

by me.





Departure Information


Dispostion


Being Evaluated By Surgeon





Referrals


No Doctor, Assigned (PCP)





Patient Instructions


My [facility]





Problem Qualifiers

## 2018-04-23 NOTE — DIAGNOSTIC IMAGING REPORT
LUMBAR SPINE 2 OR 3 VIEW



CLINICAL HISTORY: L5-S1 FUSION    



TECHNIQUE: Image intensifier



COMPARISON STUDY:  None



FINDINGS: 2 views the image intensifier confirmed the presence of an L5-S1

laminectomy and fusion. Metallic hardware is in good position. There is disc

spacer present at L5-S1.



IMPRESSION:  Anatomic alignment post laminectomy and fusion L5-S1. 









The above report was generated using voice recognition software.  It may contain

grammatical, syntax or spelling errors.







Electronically signed by:  Damaso Lee M.D.

4/23/2018 6:39 PM



Dictated Date/Time:  4/23/2018 6:39 PM

## 2018-04-23 NOTE — [facility] OPERATIVE REPORT
Operative Report


Operative Date


Apr 23, 2018.





Pre-Operative Diagnosis





herniated nuclear pulposus L5-S1 with caudal migration severe canal compromise 


and cauda equina syndrome.





Post-Operative Diagnosis





same





Procedure(s) Performed





1.  Lumbar decompression L5-S1.  #2 posterior spinal fusion L5-S1.  #3


placement posterior instrumentation L5-S1.  #4 interbody fusion L5-S1.  #5


placed a peek cage 11 x 22 mm at L5-S1.  #6 placement of locally harvested


morselized autograft in the posterior lateral gutters.  #7 placement of


ostial amp bone graft in the interbody space and posterior lateral gutters.





Surgeon


Dr Prieto





Assistant Surgeon(s)


none





Estimated Blood Loss


260ml





Findings


Massive herniated free fragment of disc





Specimens





a. L5 S1 disc





Anesthesia Type


General





Description of Procedure


Patient was met with preoperatively case discussed all questions addressed.  As 

her presentation was consistent with cauda equina syndrome I recommended 

emergent decompression.  Patient understood and agreed to proceed.  After 

informed consent obtained she was taken to the operative suite underwent 

intubation and placed in a prone position on the Negrito table on top of the 

[place] frame.  All bony prominences were well-padded eyes inspected to ensure 

no external pressure placed upon the.  This point the lumbar spine was prepped 

and draped in normal sterile fashion.  Sharp dissection with the assistance of 

Bovie cautery was performed on June exposing the lamina and transverse process 

of L5 and sacral ala bilaterally.  From a caudal cephalad fashion I removed the 

lamina of L5 as well as some of the superior portion of the S1 lamina.  

Identified a massive disc herniation with that extended caudally resting along 

the S1 vertebral body.  He was removed in its entirety.  I also addressed the 

disc herniation standing on the left lateral recess.  Pedicle screws were 

placed in L5-S1 levels bilaterally with the assistance of fluoroscopy and a 

Propacet william placed through a transforaminal approach and left complete 

discectomy was performed endplates created to subcortical bleeding bone and an 

11 x 22 mm peek cage filled with ostium bone graft tapped in position.  The 

rods were then compressed locked into final position bilaterally.  Ostium bone 

graft was placed in the posterior lateral gutters.  Including local autograft.  

15 round FARTUN drain inserted.  Incision was then closed with 1 Vicryl fascia 2-0 

Vicryl subcutaneously and 4-0 Monocryl for fashion closure Steri-Strips sterile 

dressings placed.  Patient will continue PACU stable condition.


I attest to the content of the Intraoperative Record and any orders documented 

therein.  Any exceptions are noted below.

## 2018-04-23 NOTE — HISTORY AND PHYSICAL
History & Physical


Date


Apr 23, 2018.





Chief Complaint


Back and leg pain with urinary retention and incontinence





History of Present Illness


The patient is a 22 year old female with complaints of worsening back pain leg 

pain and numbness.  She has had these symptoms for several weeks.  She does not 

describe any specific trauma fall or event.  She describes numbness involving 

upper thighs and perineal area.  She states this is been present for several 

days.  She states she is able to urinate when she sits for several minutes on 

the toilet.  She does not have spontaneous ability to urinate.  Denies any loss 

of bowel control.  She denies any gross weakness to touch to her lower 

extremities.





Past Medical/Surgical History


Medical Problems:


(1) Immunizations up to date








Additional History


Hepatic Disease:  No


Endocrine Disorder:  No


Kidney Disease:  No


Hypertension:  No


Heart Disease:  No


Bleeding Tendencies:  No


Infectious Diseases:  No


Other:


Depression





Allergies


Coded Allergies:  


     No Known Allergies (Unverified , 3/16/17)





Home Medications


Scheduled


Methylprednisolone (Medrol Dosepak), 0 PO DAILY





Scheduled PRN


Acetaminophen (Tylenol), 1,000 MG PO Q6H PRN for Pain


Ibuprofen Tab (Advil), 400 MG PO Q6H PRN for Pain


Tramadol (Ultram), 1-2 TAB PO Q4H PRN for Pain





Physical Examination


Skin:  warm/dry, no rash


Eyes:  normal inspection, EOMI, sclerae normal


ENT:  normal ENT inspection, pharynx normal


Head:  normocephalic, atraumatic


Neck:  supple, no adenopathy, trachea midline


Respiratory/Chest:  lungs clear, normal breath sounds, no respiratory distress


Cardiovascular:  regular rate, rhythm, no edema, no murmur


Abdomen / GI:  normal bowel sounds, non tender


Back:  normal inspection


Extremities:  normal inspection, normal range of motion


Neurologic/Psych:  no motor/sensory deficits, alert, normal reflexes, oriented 

x 3


Addiitonal Comments:


Patient is alert and oriented and cooperative.  She has tension signs with 

straight leg raise on the right.  She has reasonable plus 5 out of 5 strength 

to plantar flexion dorsiflexion quadriceps.  Sensory is diminished testing on 

the right lateral aspect of her foot.  She has numbness along the perineal and 

upper thigh area.  She has no abnormal skin markings.





Plan of Treatment


Assessment herniated nuclear pulposus L5-S1 with caudal migration severe canal 

compromise and cauda equina syndrome.  MRI performed yesterday is available for 

review.  I appreciate massive disc herniation L5-S1 with caudal migration.  It 

is nestled along the posterior aspect of the S1 vertebral body.  It is creating 

severe canal compromise.  Per the patient's history and exam I am concerned 

that this needs to be decompressed emergently.  I would recommend wide 

decompression in light of the pattern of disc herniation and a complete 

discectomy and fusion.  Risks benefits pros cons and alternatives were outlined 

in detail.  Risks include but not limited to death from anesthesia blindness 

stroke paralysis nerve damage blood loss current transfusion infection 

requiring reoperation.  At this time we are moving forward with surgery I did 

have a discussion with her mother is currently in [facility] describing the 

diagnosis and our concerns.  All questions were addressed.

## 2018-04-23 NOTE — ANESTHESIOLOGY PROGRESS NOTE
Anesthesia Post Op Note


Date & Time


Apr 23, 2018 at 19:34





Vital Signs


Pain Intensity:  4





Vital Signs Past 12 Hours








  Date Time  Temp Pulse Resp B/P (MAP) Pulse Ox O2 Delivery O2 Flow Rate FiO2


 


4/23/18 19:30 36.7 69 12 103/59 99 Nasal Cannula 3 


 


4/23/18 19:15  63 14 128/65 99 Nasal Cannula 4 


 


4/23/18 19:05  67 18 114/68 100 Oxymask 10 


 


4/23/18 18:55  80 16 107/63 100 Oxymask 10 


 


4/23/18 18:45 36.7 95 16 116/75 100 Oxymask 10 


 


4/23/18 15:48  68 18 123/68 100 Room Air  


 


4/23/18 14:36 36.3 89 20 112/75 97 Room Air  











Notes


Mental Status:  alert / awake / arousable, participated in evaluation


Pt Amnestic to Procedure:  Yes


Nausea / Vomiting:  adequately controlled


Pain:  adequately controlled


Airway Patency, RR, SpO2:  stable & adequate


BP & HR:  stable & adequate


Hydration State:  stable & adequate


Anesthetic Complications:  no major complications apparent

## 2018-04-24 VITALS
OXYGEN SATURATION: 99 % | DIASTOLIC BLOOD PRESSURE: 70 MMHG | HEART RATE: 67 BPM | SYSTOLIC BLOOD PRESSURE: 112 MMHG | TEMPERATURE: 98.24 F

## 2018-04-24 VITALS
DIASTOLIC BLOOD PRESSURE: 81 MMHG | SYSTOLIC BLOOD PRESSURE: 124 MMHG | HEART RATE: 78 BPM | TEMPERATURE: 98.6 F | OXYGEN SATURATION: 99 %

## 2018-04-24 VITALS
HEART RATE: 77 BPM | TEMPERATURE: 99.14 F | DIASTOLIC BLOOD PRESSURE: 72 MMHG | OXYGEN SATURATION: 94 % | SYSTOLIC BLOOD PRESSURE: 117 MMHG

## 2018-04-24 VITALS
DIASTOLIC BLOOD PRESSURE: 57 MMHG | OXYGEN SATURATION: 96 % | SYSTOLIC BLOOD PRESSURE: 93 MMHG | HEART RATE: 63 BPM | TEMPERATURE: 97.34 F

## 2018-04-24 VITALS
SYSTOLIC BLOOD PRESSURE: 112 MMHG | HEART RATE: 72 BPM | TEMPERATURE: 98.42 F | DIASTOLIC BLOOD PRESSURE: 67 MMHG | OXYGEN SATURATION: 99 %

## 2018-04-24 VITALS — OXYGEN SATURATION: 99 %

## 2018-04-24 LAB
BASOPHILS # BLD: 0 K/UL (ref 0–0.2)
BASOPHILS NFR BLD: 0 %
BUN SERPL-MCNC: 13 MG/DL (ref 7–18)
CALCIUM SERPL-MCNC: 8.7 MG/DL (ref 8.5–10.1)
CO2 SERPL-SCNC: 30 MMOL/L (ref 21–32)
CREAT SERPL-MCNC: 0.55 MG/DL (ref 0.6–1.2)
EOS ABS #: 0 K/UL (ref 0–0.5)
EOSINOPHIL NFR BLD AUTO: 181 K/UL (ref 130–400)
GLUCOSE SERPL-MCNC: 110 MG/DL (ref 70–99)
HCT VFR BLD CALC: 34.9 % (ref 37–47)
HGB BLD-MCNC: 11.5 G/DL (ref 12–16)
IG#: 0.05 K/UL (ref 0–0.02)
IMM GRANULOCYTES NFR BLD AUTO: 7.3 %
LYMPHOCYTES # BLD: 0.93 K/UL (ref 1.2–3.4)
MCH RBC QN AUTO: 26.4 PG (ref 25–34)
MCHC RBC AUTO-ENTMCNC: 33 G/DL (ref 32–36)
MCV RBC AUTO: 80.2 FL (ref 80–100)
MONO ABS #: 0.68 K/UL (ref 0.11–0.59)
MONOCYTES NFR BLD: 5.3 %
NEUT ABS #: 11.1 K/UL (ref 1.4–6.5)
NEUTROPHILS # BLD AUTO: 0 %
NEUTROPHILS NFR BLD AUTO: 87 %
PMV BLD AUTO: 10.1 FL (ref 7.4–10.4)
POTASSIUM SERPL-SCNC: 4.3 MMOL/L (ref 3.5–5.1)
RED CELL DISTRIBUTION WIDTH CV: 15.8 % (ref 11.5–14.5)
RED CELL DISTRIBUTION WIDTH SD: 46.3 FL (ref 36.4–46.3)
SODIUM SERPL-SCNC: 137 MMOL/L (ref 136–145)
WBC # BLD AUTO: 12.76 K/UL (ref 4.8–10.8)

## 2018-04-24 RX ADMIN — STANDARDIZED SENNA CONCENTRATE AND DOCUSATE SODIUM SCH TAB: 8.6; 5 TABLET ORAL at 20:37

## 2018-04-24 RX ADMIN — CEFAZOLIN SCH MLS/MIN: 10 INJECTION, POWDER, FOR SOLUTION INTRAVENOUS at 08:30

## 2018-04-24 RX ADMIN — ONDANSETRON PRN MG: 2 INJECTION INTRAMUSCULAR; INTRAVENOUS at 08:38

## 2018-04-24 RX ADMIN — CEFAZOLIN SCH MLS/MIN: 10 INJECTION, POWDER, FOR SOLUTION INTRAVENOUS at 00:33

## 2018-04-24 RX ADMIN — TRAMADOL HYDROCHLORIDE PRN MG: 50 TABLET, COATED ORAL at 22:54

## 2018-04-24 RX ADMIN — CITALOPRAM HYDROBROMIDE SCH MG: 20 TABLET ORAL at 08:30

## 2018-04-24 RX ADMIN — SODIUM CHLORIDE, SODIUM LACTATE, POTASSIUM CHLORIDE, AND CALCIUM CHLORIDE SCH MLS/HR: 600; 310; 30; 20 INJECTION, SOLUTION INTRAVENOUS at 02:33

## 2018-04-24 RX ADMIN — KETOROLAC TROMETHAMINE PRN MG: 15 INJECTION INTRAMUSCULAR; INTRAVENOUS at 12:25

## 2018-04-24 RX ADMIN — KETOROLAC TROMETHAMINE PRN MG: 15 INJECTION INTRAMUSCULAR; INTRAVENOUS at 18:27

## 2018-04-24 NOTE — PROGRESS NOTE
Progress Note


Date of Service


Apr 24, 2018.





Progress Note


Patient's back pain is controlled.  She feels her perineal numbness is somewhat 

improved.  Vital signs are stable.  On exam she has reasonable strength 

testing.  Assessment status post lumbar decompression fusion per plan at this 

time and will initiate physical therapy today discontinue her Crawford and assess 

her progress over the next few days.

## 2018-04-24 NOTE — ANESTHESIOLOGY PROGRESS NOTE
Anesthesia Post Op Note


Date & Time


Apr 24, 2018 at 08:18





Vital Signs


Pain Intensity:  8.0





Vital Signs Past 12 Hours








  Date Time  Temp Pulse Resp B/P (MAP) Pulse Ox O2 Delivery O2 Flow Rate FiO2


 


4/24/18 07:28 37.0 78 16 124/81 (95) 99 Room Air  


 


4/24/18 07:20      Room Air  


 


4/24/18 02:53 36.3 63 14 93/57 (69) 96 Room Air  


 


4/24/18 00:30      Room Air  


 


4/23/18 22:43 36.7 66 16 99/62 (74) 94 Room Air  


 


4/23/18 21:44 36.8 61 16 107/64 (78) 99 Room Air  


 


4/23/18 20:40 36.8 63 16 110/73 (85) 100 Nasal Cannula 2.0 











Notes


Mental Status:  alert / awake / arousable, participated in evaluation


Pt Amnestic to Procedure:  Yes


Nausea / Vomiting:  adequately controlled


Pain:  adequately controlled


Airway Patency, RR, SpO2:  stable & adequate


BP & HR:  stable & adequate


Hydration State:  stable & adequate


Anesthetic Complications:  no major complications apparent

## 2018-04-25 VITALS
OXYGEN SATURATION: 97 % | TEMPERATURE: 98.42 F | DIASTOLIC BLOOD PRESSURE: 68 MMHG | HEART RATE: 69 BPM | SYSTOLIC BLOOD PRESSURE: 106 MMHG

## 2018-04-25 VITALS
DIASTOLIC BLOOD PRESSURE: 71 MMHG | OXYGEN SATURATION: 98 % | TEMPERATURE: 98.06 F | SYSTOLIC BLOOD PRESSURE: 109 MMHG | HEART RATE: 66 BPM

## 2018-04-25 VITALS
SYSTOLIC BLOOD PRESSURE: 123 MMHG | TEMPERATURE: 98.06 F | OXYGEN SATURATION: 98 % | DIASTOLIC BLOOD PRESSURE: 83 MMHG | HEART RATE: 80 BPM

## 2018-04-25 RX ADMIN — Medication SCH GM: at 23:18

## 2018-04-25 RX ADMIN — ONDANSETRON PRN MG: 2 INJECTION INTRAMUSCULAR; INTRAVENOUS at 14:25

## 2018-04-25 RX ADMIN — KETOROLAC TROMETHAMINE PRN MG: 15 INJECTION INTRAMUSCULAR; INTRAVENOUS at 14:21

## 2018-04-25 RX ADMIN — Medication SCH GM: at 05:33

## 2018-04-25 RX ADMIN — Medication SCH GM: at 11:42

## 2018-04-25 RX ADMIN — CITALOPRAM HYDROBROMIDE SCH MG: 20 TABLET ORAL at 07:30

## 2018-04-25 RX ADMIN — STANDARDIZED SENNA CONCENTRATE AND DOCUSATE SODIUM SCH TAB: 8.6; 5 TABLET ORAL at 21:07

## 2018-04-25 RX ADMIN — KETOROLAC TROMETHAMINE PRN MG: 15 INJECTION INTRAMUSCULAR; INTRAVENOUS at 21:05

## 2018-04-25 RX ADMIN — ONDANSETRON PRN MG: 2 INJECTION INTRAMUSCULAR; INTRAVENOUS at 00:31

## 2018-04-25 RX ADMIN — KETOROLAC TROMETHAMINE PRN MG: 15 INJECTION INTRAMUSCULAR; INTRAVENOUS at 07:34

## 2018-04-25 RX ADMIN — Medication SCH GM: at 17:43

## 2018-04-25 RX ADMIN — KETOROLAC TROMETHAMINE PRN MG: 15 INJECTION INTRAMUSCULAR; INTRAVENOUS at 00:31

## 2018-04-25 NOTE — DISCHARGE INSTRUCTIONS
Discharge Instructions


Date of Service


Apr 25, 2018.





Admission


Reason for Admission:  Cauda Equina Syndrome





Discharge


Discharge Diagnosis / Problem:  lumbar stenosis





Discharge Goals


Goal(s):  Improve function





Activity Recommendations


Activity Limitations:  per Instructions/Follow-up section





.





Instructions / Follow-Up


Instructions / Follow-Up





ACTIVITY RECOMMENDATIONS:





SELF CARE INSTRUCTIONS AFTER THORACIC/LUMBAR FUSIONS





1.  You may walk to your tolerance.  It is good exercise for your legs and back.


      Expect some back and intermittent leg aches and pains.





2.  You may perform "counter-top" level activities (make a sandwich, katrin 

with a


     project, etc.).





3.  No bending or lifting of more than 10 pounds or back twisting of any nature 

(roll


      like a log when turning in bed).





4.  You may ride in a car for 20-30 minutes at a time.  No driving until after 

your


      first visit with your doctor.





5.  Frequent changes of position and restricting sitting to 30 minutes at a 

time will


      help limit the amount of back spasms and stiffness you may experience.





6.  You may discontinue the use of ambulatory aids (cane, crutches, etc.) once 

your


      strength and confidence allow.





7.  You may  the shower and let water strike your incision when you 

arrive 


     home at least once daily.  Do not take a tub bath, sit in a hot tub or go 

into a


     swimming pool until after your first recheck in the office.








SPECIAL CARE INSTRUCTIONS:





**VERY IMPORTANT TO READ AND REVIEW**





A.  Your surgical incision has been closed with a cosmetic suture under the 


     skin that will dissolve in about 6 weeks.  In 14 days, you can use a pair 


     of clean scissors and cut the suture that is left outside of the skin at 

the 


     ends of your incision.


   1.  The small skin tapes can be removed 7 days after surgery if they 


               have not fallen off by that point.


   2.  You may keep the wound open to air as much as possible to promote


              healing after post-op day number 5 unless told otherwise by your 

doctor.


   3.  If you think the wound looks like it is becoming infected (redness or 


              worsening drainage) and/or you are experiencing fever, chill or 

worsening


              back pain and muscle spasms, contact the office so that we may 

evaluate


              you as soon as possible.





B.  Complications are uncommon, but please contact us if you have any signs or 


     symptoms of:


   1.  wound infection (fever higher than 102.5 degrees F, redness, separation


              of wound, drainage, or increasing pain from the incision) 


   2.  blood clots in legs (pain, swelling, redness and warmth in legs)


   3.  urinary tract infection (fever higher than 102.5 degrees F, burning upon


              urination or increased frequency of urination)


   4.  nerve problems (inability to walk on your toes or heels, numbness, loss 


              of bowel or bladder control)


   5.  any other symptoms that concern you





C.  Please call the office at [phone] if you have any concerns or 

questions


     about your operation or recovery.





D.  No smoking!  Smoking drastically decreases the chance of a solid fusion.





E.  Do not take any anti-inflammatory medications (Indocin, Advil, Motrin, 

Aspirin, 


     Naprosyn, etc.) as these may inhibit the chance of a solid fusion.  

Tylenol is


     okay to take for pain.








MANAGING PAIN AFTER SPINAL SURGERY





1.  Narcotic medication is intended for short-term use and will be provided for 


     surgical pain.  Surgical pain usually lasts for a period of 4-6 weeks.  

Narcotic 


     medication includes Percocet, Vicodin, Darvocet, Tylenol #3 or Lortab.





2.  Longer-term pain is more appropriately treated with non-narcotic medication 


    such as Tylenol ES.





3.  Muscle spasm is not appropriately treated with narcotics.  Muscle relaxers 

such


    as Soma, Flexeril or Skelaxin can be used along with Tylenol ES.





4.  Remember that we all live with some "aches and pains".  This is not unusual 

or 


     uncommon after an injury or as we get older.


   a.  Back pain is expected and may include muscle spasms for 4 to 6 weeks 


              after surgery.  The pain should gradually improve.  If the pain 

worsens for 


              no apparent reason, please contact the office.


   b.  Intermittent leg pain may also be experienced and should not be concerned


        about unless it worsens for no apparent reason.  If so, please contact 

the


               office.





5.  We will provide appropriate medication within the normal guidelines of 

their prescribed


     use.  We will also be very cautious and aware of potential abuse and 

extended


     duration of patients' medication needs.


   a.  Pain medications are for your comfort and to assist with sleep and


        rest so that the tissue can heal.  They are not provided in order to 

return 


              to normal activity and should not be used through the day.  To do 

so or 


              worsening pain at night can result from ongoing tissue damage and 

development


              of tolerance to the prescribed medicine.





6.  Please allow 2-3 days to process refills.  Prescriptions will not be mailed 

but must be


     picked up at the office.








FOLLOW UP VISIT:





Keep your scheduled follow-up appointment. 





Any questions, please call the office at [phone].





Current Hospital Diet


Patient's current hospital diet: Regular Diet





Discharge Diet


Recommended Diet:  Regular Diet





Procedures


Procedures Performed:  


1.  Lumbar decompression L5-S1.  #2 posterior spinal fusion L5-S1.  #3


placement posterior instrumentation L5-S1.  #4 interbody fusion L5-S1.  #5


placed a peek cage 11 x 22 mm at L5-S1.  #6 placement of locally harvested


morselized autograft in the posterior lateral gutters.  #7 placement of


ostial amp bone graft in the interbody space and posterior lateral gutters.





Pending Studies


Studies pending at discharge:  no





Medical Emergencies








.


Who to Call and When:





Medical Emergencies:  If at any time you feel your situation is an emergency, 

please call 911 immediately.





.





Non-Emergent Contact


Non-Emergency issues call your:  Primary Care Provider


.








"Provider Documentation" section prepared by Navid Prieto.








.

## 2018-04-25 NOTE — PROGRESS NOTE
Progress Note


Date of Service


Apr 25, 2018.





Progress Note


Patient's back pain is controlled.  She denies any leg pain.  Most importantly 

she feels as though her urination is more controlled in that she is able to 

empty her bladder.  She is tolerating physical therapy relatively well.  Vital 

signs are stable.  On exam she is good strength testing appears comfortable.  

Assessment status post lumbar decompression fusion.  Plan at this time will 

maintain the FARTUN drain anticipate removal tomorrow and working towards discharge 

and home health.

## 2018-04-26 VITALS
OXYGEN SATURATION: 100 % | TEMPERATURE: 98.24 F | DIASTOLIC BLOOD PRESSURE: 65 MMHG | SYSTOLIC BLOOD PRESSURE: 110 MMHG | HEART RATE: 63 BPM

## 2018-04-26 VITALS
TEMPERATURE: 98.42 F | DIASTOLIC BLOOD PRESSURE: 72 MMHG | OXYGEN SATURATION: 100 % | HEART RATE: 67 BPM | SYSTOLIC BLOOD PRESSURE: 110 MMHG

## 2018-04-26 VITALS
TEMPERATURE: 98.24 F | OXYGEN SATURATION: 100 % | HEART RATE: 76 BPM | DIASTOLIC BLOOD PRESSURE: 74 MMHG | SYSTOLIC BLOOD PRESSURE: 114 MMHG

## 2018-04-26 RX ADMIN — KETOROLAC TROMETHAMINE PRN MG: 15 INJECTION INTRAMUSCULAR; INTRAVENOUS at 05:43

## 2018-04-26 RX ADMIN — CITALOPRAM HYDROBROMIDE SCH MG: 20 TABLET ORAL at 10:01

## 2018-04-26 RX ADMIN — KETOROLAC TROMETHAMINE PRN MG: 15 INJECTION INTRAMUSCULAR; INTRAVENOUS at 18:29

## 2018-04-26 RX ADMIN — Medication SCH GM: at 12:00

## 2018-04-26 RX ADMIN — KETOROLAC TROMETHAMINE PRN MG: 15 INJECTION INTRAMUSCULAR; INTRAVENOUS at 12:10

## 2018-04-26 RX ADMIN — ONDANSETRON PRN MG: 2 INJECTION INTRAMUSCULAR; INTRAVENOUS at 18:29

## 2018-04-26 RX ADMIN — ONDANSETRON PRN MG: 2 INJECTION INTRAMUSCULAR; INTRAVENOUS at 05:43

## 2018-04-26 RX ADMIN — Medication SCH GM: at 05:42

## 2018-04-26 RX ADMIN — ONDANSETRON PRN MG: 2 INJECTION INTRAMUSCULAR; INTRAVENOUS at 12:11

## 2018-04-26 RX ADMIN — STANDARDIZED SENNA CONCENTRATE AND DOCUSATE SODIUM SCH TAB: 8.6; 5 TABLET ORAL at 20:59

## 2018-04-26 NOTE — PROGRESS NOTE
Progress Note


Date of Service


Apr 26, 2018.





Progress Note


Patient's back pain is controlled.  She still has left leg numbness.  Some 

perineal numbness persists.  She is however having bowel movements and urinary 

function reasonably well.  On exam she is good strength testing appears 

comfortable.  Assessment status post lumbar decompression fusion.  Plan at this 

time we will DC the drain today assess her progress possible discharge home 

tomorrow.

## 2018-04-27 VITALS
DIASTOLIC BLOOD PRESSURE: 73 MMHG | SYSTOLIC BLOOD PRESSURE: 110 MMHG | TEMPERATURE: 97.7 F | OXYGEN SATURATION: 100 % | HEART RATE: 74 BPM

## 2018-04-27 VITALS
DIASTOLIC BLOOD PRESSURE: 62 MMHG | HEART RATE: 66 BPM | OXYGEN SATURATION: 96 % | SYSTOLIC BLOOD PRESSURE: 98 MMHG | TEMPERATURE: 98.42 F

## 2018-04-27 VITALS
TEMPERATURE: 97.7 F | DIASTOLIC BLOOD PRESSURE: 73 MMHG | OXYGEN SATURATION: 100 % | SYSTOLIC BLOOD PRESSURE: 110 MMHG | HEART RATE: 74 BPM

## 2018-04-27 RX ADMIN — CITALOPRAM HYDROBROMIDE SCH MG: 20 TABLET ORAL at 08:50

## 2018-04-27 RX ADMIN — ONDANSETRON PRN MG: 2 INJECTION INTRAMUSCULAR; INTRAVENOUS at 08:51

## 2018-04-27 RX ADMIN — TRAMADOL HYDROCHLORIDE PRN MG: 50 TABLET, COATED ORAL at 08:48

## 2018-04-27 RX ADMIN — KETOROLAC TROMETHAMINE PRN MG: 15 INJECTION INTRAMUSCULAR; INTRAVENOUS at 03:55

## 2018-04-27 RX ADMIN — TRAMADOL HYDROCHLORIDE PRN MG: 50 TABLET, COATED ORAL at 14:25

## 2018-04-27 NOTE — DISCHARGE SUMMARY
Orthopedic Discharge Summary


Admission Date/Reason


Apr 23, 2018 at 18:36


Cauda Equina Syndrome.





Discharge Date/Disposition


Apr 27, 2018


Home





Diagnosis


Principal Diagnosis:


Cauda equina syndrome with herniated nucleus pulposus L5-S1





Admission Physical Exam


As per Admitting History & Physical.





Hospital Course


Patient was admitted on Monday underwent urgent decompression and fusion the 

lumbar spine.  Postoperatively symptoms slowly improved.  She began 

experiencing improved urinary control.  Still perineal numbness and numbness 

along the left lower extremity.  She progressed nicely throughout the week 

ambulating independently.  FARTUN drain decreasing appropriately.  Subsequently she 

was discharged home.  Discharge orders and instructions found in chart for 

further review.





Discharge Instructions


Please refer to the electronic Patient Visit Report (Discharge Instructions) 

for additional information.